# Patient Record
Sex: FEMALE | Race: WHITE | URBAN - METROPOLITAN AREA
[De-identification: names, ages, dates, MRNs, and addresses within clinical notes are randomized per-mention and may not be internally consistent; named-entity substitution may affect disease eponyms.]

---

## 2021-02-08 ENCOUNTER — WEB ENCOUNTER (OUTPATIENT)
Dept: URBAN - METROPOLITAN AREA CLINIC 96 | Facility: CLINIC | Age: 26
End: 2021-02-08

## 2021-02-08 ENCOUNTER — OFFICE VISIT (OUTPATIENT)
Dept: URBAN - METROPOLITAN AREA CLINIC 96 | Facility: CLINIC | Age: 26
End: 2021-02-08
Payer: COMMERCIAL

## 2021-02-08 DIAGNOSIS — R14.0 BLOATING: ICD-10-CM

## 2021-02-08 DIAGNOSIS — R12 HEARTBURN: ICD-10-CM

## 2021-02-08 DIAGNOSIS — R11.2 NAUSEA AND VOMITING, INTRACTABILITY OF VOMITING NOT SPECIFIED, UNSPECIFIED VOMITING TYPE: ICD-10-CM

## 2021-02-08 DIAGNOSIS — R10.13 EPIGASTRIC PAIN: ICD-10-CM

## 2021-02-08 PROCEDURE — 99204 OFFICE O/P NEW MOD 45 MIN: CPT | Performed by: INTERNAL MEDICINE

## 2021-02-08 PROCEDURE — G8420 CALC BMI NORM PARAMETERS: HCPCS | Performed by: INTERNAL MEDICINE

## 2021-02-08 PROCEDURE — G9903 PT SCRN TBCO ID AS NON USER: HCPCS | Performed by: INTERNAL MEDICINE

## 2021-02-08 PROCEDURE — G8483 FLU IMM NO ADMIN DOC REA: HCPCS | Performed by: INTERNAL MEDICINE

## 2021-02-08 PROCEDURE — 1036F TOBACCO NON-USER: CPT | Performed by: INTERNAL MEDICINE

## 2021-02-08 PROCEDURE — G8427 DOCREV CUR MEDS BY ELIG CLIN: HCPCS | Performed by: INTERNAL MEDICINE

## 2021-02-08 RX ORDER — ONDANSETRON HYDROCHLORIDE 4 MG/1
1 TABLET TABLET, FILM COATED ORAL ONCE A DAY
Status: ACTIVE | COMMUNITY

## 2021-02-08 RX ORDER — NITROFURANTOIN MONOHYDRATE/MACROCRYSTALLINE 25; 75 MG/1; MG/1
1 CAPSULE AT BEDTIME WITH FOOD CAPSULE ORAL ONCE A DAY
Status: ACTIVE | COMMUNITY

## 2021-02-08 RX ORDER — TRAMADOL HYDROCHLORIDE 50 MG/1
1 TABLET AS NEEDED TABLET, FILM COATED ORAL ONCE A DAY
Status: ACTIVE | COMMUNITY

## 2021-02-08 RX ORDER — DICYCLOMINE HYDROCHLORIDE 10 MG/1
2 CAPSULES CAPSULE ORAL THREE TIMES A DAY
Status: ACTIVE | COMMUNITY

## 2021-02-08 RX ORDER — CYCLOBENZAPRINE HYDROCHLORIDE 5 MG/1
1 TABLET AT BEDTIME AS NEEDED TABLET, FILM COATED ORAL ONCE A DAY
Status: ACTIVE | COMMUNITY

## 2021-02-08 NOTE — HPI-OTHER HISTORIES
26 yo female presents with sudden onset epigastric pain. No prior such sx. Awoke her from sleep with n/v, no hemetemesis, no coffee ground emesis.  Onset after eating tacos out. No fever, did have chills. Seen at Urgent Care with negative eval and provided Zofran. No melena, no rectal bleeding, did have diarrhea. Did have reflux sx. Does not take any meds for GERD. Reports back pain and will take ibuprofen prn.   RUQ ultrasound 2/2/2021 normal. CXR normal. Labs with WBC 6.8, Hb 15.2, platelet 293. AST 16, ALT 15, lipase 19. Urine tox screen positive for opiate. (see chart for reviewed records). Take Ultram as needed for back pain. MRI back normal last 3/2020.   Denies any psych hx. No eating d/o. No prior celiac testing. No diet restrictions.   No fam hx of GI CA, no fam hx of celiac disease.   No hx of PUD. Regular alcohol use. No MJ. No chance pregnant. No STD. No hx of hepatitis. Moved from [place], [place]. Trying to find new primary MD.   No hx of endometriosis. No GYN issues. Does report period pains--heraclio d/w with GYN once established.  Does report bloating, denies any early satiety.  No prior CT. No sick exposure. Pain improved. Able to tolerate po intake, trying to minimize spicy foods.

## 2021-03-02 ENCOUNTER — OFFICE VISIT (OUTPATIENT)
Dept: URBAN - METROPOLITAN AREA SURGERY CENTER 18 | Facility: SURGERY CENTER | Age: 26
End: 2021-03-02
Payer: COMMERCIAL

## 2021-03-02 DIAGNOSIS — K29.30 CHRONIC SUPERFICIAL GASTRITIS: ICD-10-CM

## 2021-03-02 DIAGNOSIS — R11.2 ACUTE NAUSEA WITH NONBILIOUS VOMITING: ICD-10-CM

## 2021-03-02 PROCEDURE — G8907 PT DOC NO EVENTS ON DISCHARG: HCPCS | Performed by: INTERNAL MEDICINE

## 2021-03-02 PROCEDURE — 43239 EGD BIOPSY SINGLE/MULTIPLE: CPT | Performed by: INTERNAL MEDICINE

## 2021-03-02 RX ORDER — TRAMADOL HYDROCHLORIDE 50 MG/1
1 TABLET AS NEEDED TABLET, FILM COATED ORAL ONCE A DAY
COMMUNITY

## 2021-03-02 RX ORDER — DICYCLOMINE HYDROCHLORIDE 10 MG/1
2 CAPSULES CAPSULE ORAL THREE TIMES A DAY
COMMUNITY

## 2021-03-02 RX ORDER — CYCLOBENZAPRINE HYDROCHLORIDE 5 MG/1
1 TABLET AT BEDTIME AS NEEDED TABLET, FILM COATED ORAL ONCE A DAY
COMMUNITY

## 2021-03-02 RX ORDER — NITROFURANTOIN MONOHYDRATE/MACROCRYSTALLINE 25; 75 MG/1; MG/1
1 CAPSULE AT BEDTIME WITH FOOD CAPSULE ORAL ONCE A DAY
COMMUNITY

## 2021-03-02 RX ORDER — ONDANSETRON HYDROCHLORIDE 4 MG/1
1 TABLET TABLET, FILM COATED ORAL ONCE A DAY
COMMUNITY

## 2021-03-02 NOTE — HPI-OTHER HISTORIES
24 yo female seen 2/8/2021 after ER visit 2/2/2021 for sudden onset epigastric pain. No prior such sx. Awoke her from sleep with n/v, no hemetemesis, no coffee ground emesis.  Onset after eating tacos out. No fever, did have chills. Seen at Urgent Care with negative eval and provided Zofran. No melena, no rectal bleeding, did have diarrhea. Did have reflux sx. Does not take any meds for GERD. Reports back pain and will take ibuprofen prn.   RUQ ultrasound 2/2/2021 normal. CXR normal. Labs with WBC 6.8, Hb 15.2, platelet 293. AST 16, ALT 15, lipase 19. Urine tox screen positive for opiate. (see chart for reviewed records). Take Ultram as needed for back pain. MRI back normal last 3/2020.   Denies any psych hx. No eating d/o. No prior celiac testing. No diet restrictions.   No fam hx of GI CA, no fam hx of celiac disease.   No hx of PUD. Regular alcohol use. No MJ. No chance pregnant. No STD. No hx of hepatitis. Moved from [place], [place]. Trying to find new primary MD.   No hx of endometriosis. No GYN issues. Does report period pains--heraclio d/w with GYN once established.  Does report bloating, denies any early satiety.  No prior CT. No sick exposure. Pain improved. Able to tolerate po intake, trying to minimize spicy foods.

## 2021-07-12 ENCOUNTER — TELEPHONE ENCOUNTER (OUTPATIENT)
Dept: URBAN - METROPOLITAN AREA SURGERY CENTER 30 | Facility: SURGERY CENTER | Age: 26
End: 2021-07-12

## 2021-07-12 RX ORDER — ONDANSETRON HYDROCHLORIDE 4 MG/1
1 TABLET TABLET, FILM COATED ORAL ONCE A DAY
Qty: 30 TABLET | Refills: 0

## 2021-07-12 RX ORDER — DICYCLOMINE HYDROCHLORIDE 10 MG/1
2 CAPSULES CAPSULE ORAL BID
Qty: 120 CAPSULE | Refills: 1

## 2021-08-03 ENCOUNTER — ERX REFILL RESPONSE (OUTPATIENT)
Dept: URBAN - METROPOLITAN AREA CLINIC 96 | Facility: CLINIC | Age: 26
End: 2021-08-03

## 2021-08-03 RX ORDER — DICYCLOMINE HYDROCHLORIDE 10 MG/1
TAKE 2 CAPSULES BY MOUTH TWICE DAILY FOR 30 DAYS CAPSULE ORAL
Qty: 120 CAPSULE | Refills: 2 | OUTPATIENT

## 2021-08-03 RX ORDER — DICYCLOMINE HYDROCHLORIDE 10 MG/1
2 CAPSULES CAPSULE ORAL BID
Qty: 120 CAPSULE | Refills: 1 | OUTPATIENT

## 2021-12-01 ENCOUNTER — TELEPHONE ENCOUNTER (OUTPATIENT)
Dept: URBAN - METROPOLITAN AREA CLINIC 98 | Facility: CLINIC | Age: 26
End: 2021-12-01

## 2021-12-21 ENCOUNTER — OFFICE VISIT (OUTPATIENT)
Dept: URBAN - METROPOLITAN AREA CLINIC 96 | Facility: CLINIC | Age: 26
End: 2021-12-21
Payer: COMMERCIAL

## 2021-12-21 VITALS
TEMPERATURE: 98.4 F | DIASTOLIC BLOOD PRESSURE: 81 MMHG | HEART RATE: 82 BPM | BODY MASS INDEX: 23.6 KG/M2 | HEIGHT: 61 IN | WEIGHT: 125 LBS | SYSTOLIC BLOOD PRESSURE: 113 MMHG

## 2021-12-21 DIAGNOSIS — R11.2 NAUSEA AND VOMITING, INTRACTABILITY OF VOMITING NOT SPECIFIED, UNSPECIFIED VOMITING TYPE: ICD-10-CM

## 2021-12-21 DIAGNOSIS — R93.3 ABNORMAL CT SCAN, SMALL BOWEL: ICD-10-CM

## 2021-12-21 DIAGNOSIS — K56.1 SMALL BOWEL INTUSSUSCEPTION: ICD-10-CM

## 2021-12-21 DIAGNOSIS — R10.84 GENERALIZED ABDOMINAL PAIN: ICD-10-CM

## 2021-12-21 DIAGNOSIS — K29.70 GASTRITIS WITHOUT BLEEDING, UNSPECIFIED CHRONICITY, UNSPECIFIED GASTRITIS TYPE: ICD-10-CM

## 2021-12-21 DIAGNOSIS — K44.9 HIATAL HERNIA: ICD-10-CM

## 2021-12-21 DIAGNOSIS — R19.7 DIARRHEA, UNSPECIFIED TYPE: ICD-10-CM

## 2021-12-21 PROCEDURE — 99214 OFFICE O/P EST MOD 30 MIN: CPT | Performed by: INTERNAL MEDICINE

## 2021-12-21 RX ORDER — CYCLOBENZAPRINE HYDROCHLORIDE 5 MG/1
1 TABLET AT BEDTIME AS NEEDED TABLET, FILM COATED ORAL ONCE A DAY
Status: ACTIVE | COMMUNITY

## 2021-12-21 RX ORDER — NITROFURANTOIN MONOHYDRATE/MACROCRYSTALLINE 25; 75 MG/1; MG/1
1 CAPSULE AT BEDTIME WITH FOOD CAPSULE ORAL ONCE A DAY
Status: ACTIVE | COMMUNITY

## 2021-12-21 RX ORDER — ONDANSETRON HYDROCHLORIDE 4 MG/1
1 TABLET TABLET, FILM COATED ORAL ONCE A DAY
Qty: 30 TABLET | Refills: 0 | Status: ACTIVE | COMMUNITY

## 2021-12-21 RX ORDER — DICYCLOMINE HYDROCHLORIDE 10 MG/1
TAKE 2 CAPSULES BY MOUTH TWICE DAILY FOR 30 DAYS CAPSULE ORAL
Qty: 120 CAPSULE | Refills: 2 | Status: ACTIVE | COMMUNITY

## 2021-12-21 RX ORDER — TRAMADOL HYDROCHLORIDE 50 MG/1
1 TABLET AS NEEDED TABLET, FILM COATED ORAL ONCE A DAY
Status: ACTIVE | COMMUNITY

## 2021-12-21 NOTE — HPI-OTHER HISTORIES
26 yo female seen 2/8/2021 after ER visit 2/2/2021 for sudden onset epigastric pain. No prior such sx. Awoke her from sleep with n/v, no hemetemesis, no coffee ground emesis.  Onset after eating tacos out. No fever, did have chills. Seen at Urgent Care with negative eval and provided Zofran. No melena, no rectal bleeding, did have diarrhea. Did have reflux sx. Does not take any meds for GERD. Reports back pain and will take ibuprofen prn.   RUQ ultrasound 2/2/2021 normal. CXR normal. Labs with WBC 6.8, Hb 15.2, platelet 293. AST 16, ALT 15, lipase 19. Urine tox screen positive for opiate. (see chart for reviewed records). Take Ultram as needed for back pain. MRI back normal last 3/2020.   Denies any psych hx. No eating d/o. No prior celiac testing. No diet restrictions.   No fam hx of GI CA, no fam hx of celiac disease.   No hx of PUD. Regular alcohol use. No MJ. No chance pregnant. No STD. No hx of hepatitis. Moved from [place], [place]. Trying to find new primary MD.   No hx of endometriosis. No GYN issues. Does report period pains--heraclio d/w with GYN once established.  Does report bloating, denies any early satiety.  No sick exposure. Pain improved. Able to tolerate po intake, trying to minimize spicy foods.  EGD done 3/2/2021 with small hh noted, normal duodenal bx, mild gastritis with no H pylori, lower esophageal bx normal.  Overall doing well aside from 1 recent episode 3 weeks ago. Reports had decreased appetite, nausea with emesis. No post prandial exacerbation. No alcohol. No MJ use. No hematemesis, no coffee ground emesis. No melena, no rectal bleeding. Had diarrhea with watery stool. No diet changes, no sick exposure. Seen at ER and CT imaging was done 12/2/2021. CT with small bowel small bowel intussusception, likely incidental. Otherwise no acute abrnomality identified. WBC 5.0, Hb 16.2. AST 23, ALT 13. Lipase 25. (see chart for reviewed records)

## 2022-01-06 ENCOUNTER — LAB OUTSIDE AN ENCOUNTER (OUTPATIENT)
Dept: URBAN - METROPOLITAN AREA CLINIC 96 | Facility: CLINIC | Age: 27
End: 2022-01-06

## 2022-01-10 ENCOUNTER — DASHBOARD ENCOUNTERS (OUTPATIENT)
Age: 27
End: 2022-01-10

## 2022-01-10 ENCOUNTER — WEB ENCOUNTER (OUTPATIENT)
Dept: URBAN - METROPOLITAN AREA CLINIC 96 | Facility: CLINIC | Age: 27
End: 2022-01-10

## 2022-01-10 ENCOUNTER — OFFICE VISIT (OUTPATIENT)
Dept: URBAN - METROPOLITAN AREA CLINIC 96 | Facility: CLINIC | Age: 27
End: 2022-01-10
Payer: COMMERCIAL

## 2022-01-10 DIAGNOSIS — R63.5 WEIGHT GAIN: ICD-10-CM

## 2022-01-10 DIAGNOSIS — K29.70 GASTRITIS WITHOUT BLEEDING, UNSPECIFIED CHRONICITY, UNSPECIFIED GASTRITIS TYPE: ICD-10-CM

## 2022-01-10 DIAGNOSIS — R11.2 NAUSEA AND VOMITING, UNSPECIFIED VOMITING TYPE: ICD-10-CM

## 2022-01-10 DIAGNOSIS — R10.10 PAIN OF UPPER ABDOMEN: ICD-10-CM

## 2022-01-10 PROBLEM — 4556007: Status: ACTIVE | Noted: 2021-12-21

## 2022-01-10 PROCEDURE — 99213 OFFICE O/P EST LOW 20 MIN: CPT | Performed by: PHYSICIAN ASSISTANT

## 2022-01-10 RX ORDER — ONDANSETRON HYDROCHLORIDE 4 MG/1
1 TABLET TABLET, FILM COATED ORAL ONCE A DAY
Qty: 30 TABLET | Refills: 0 | Status: ACTIVE | COMMUNITY

## 2022-01-10 RX ORDER — CYCLOBENZAPRINE HYDROCHLORIDE 5 MG/1
1 TABLET AT BEDTIME AS NEEDED TABLET, FILM COATED ORAL ONCE A DAY
Status: ACTIVE | COMMUNITY

## 2022-01-10 RX ORDER — DICYCLOMINE HYDROCHLORIDE 10 MG/1
1 TABLET CAPSULE ORAL
Qty: 60 | Refills: 2

## 2022-01-10 RX ORDER — DICYCLOMINE HYDROCHLORIDE 10 MG/1
TAKE 2 CAPSULES BY MOUTH TWICE DAILY FOR 30 DAYS CAPSULE ORAL
Qty: 120 CAPSULE | Refills: 2 | Status: ACTIVE | COMMUNITY

## 2022-01-10 RX ORDER — TRAMADOL HYDROCHLORIDE 50 MG/1
1 TABLET AS NEEDED TABLET, FILM COATED ORAL ONCE A DAY
Status: ACTIVE | COMMUNITY

## 2022-01-10 RX ORDER — ONDANSETRON 8 MG/1
1 TABLET ON THE TONGUE AND ALLOW TO DISSOLVE  AS NEEDED TABLET, ORALLY DISINTEGRATING ORAL
Qty: 30 | Refills: 3 | OUTPATIENT
Start: 2022-01-10

## 2022-01-10 RX ORDER — FAMOTIDINE 40 MG/1
1 TABLET AT BEDTIME TABLET, FILM COATED ORAL ONCE A DAY
Qty: 90 TABLET | Refills: 3 | OUTPATIENT
Start: 2022-01-10

## 2022-01-10 RX ORDER — NITROFURANTOIN MONOHYDRATE/MACROCRYSTALLINE 25; 75 MG/1; MG/1
1 CAPSULE AT BEDTIME WITH FOOD CAPSULE ORAL ONCE A DAY
Status: ACTIVE | COMMUNITY

## 2022-01-10 RX ORDER — PANTOPRAZOLE SODIUM 40 MG/1
1 TABLET TABLET, DELAYED RELEASE ORAL ONCE A DAY
Qty: 90 TABLET | Refills: 1 | OUTPATIENT
Start: 2022-01-10

## 2022-01-10 NOTE — HPI-TODAY'S VISIT:
Pt feels fine right now Small bowel follow thru was normal - moved thru fast When she is sick she gets intense midepigastric pain for days - chest pain into upper back, nausea, emesis, diarrhea, fevers and chills - lasts 4-7 days Usually will not even be able to keep water down and needs iv fluids Happened 4 times in 2021 Has had egd, ct and sbft done  Could not tolerate zofran po when it happens - could not keep it down - does have some phenergan supp for next time from the ER Her normal bowel habit is 1-2 times a week

## 2022-01-11 LAB
A/G RATIO: 1.9
ALBUMIN: 4.6
ALKALINE PHOSPHATASE: 49
ALT (SGPT): 13
AST (SGOT): 20
BASO (ABSOLUTE): 0
BASOS: 0
BILIRUBIN, TOTAL: 0.3
BUN/CREATININE RATIO: 12
BUN: 9
C-REACTIVE PROTEIN, QUANT: <1
CALCIUM: 9.4
CARBON DIOXIDE, TOTAL: 21
CHLORIDE: 103
CREATININE: 0.73
EGFR IF AFRICN AM: 131
EGFR IF NONAFRICN AM: 113
EOS (ABSOLUTE): 0.2
EOS: 3
GLOBULIN, TOTAL: 2.4
GLUCOSE: 92
HEMATOCRIT: 43.4
HEMATOLOGY COMMENTS:: (no result)
HEMOGLOBIN: 14.2
IMMATURE CELLS: (no result)
IMMATURE GRANS (ABS): 0
IMMATURE GRANULOCYTES: 0
LYMPHS (ABSOLUTE): 1.7
LYMPHS: 32
MCH: 28.4
MCHC: 32.7
MCV: 87
MONOCYTES(ABSOLUTE): 0.5
MONOCYTES: 10
NEUTROPHILS (ABSOLUTE): 2.9
NEUTROPHILS: 55
NRBC: (no result)
PLATELETS: 269
POTASSIUM: 4.5
PROTEIN, TOTAL: 7
RBC: 5
RDW: 13.1
SODIUM: 136
T4,FREE(DIRECT): 1.19
TSH: 0.94
WBC: 5.2

## 2022-08-23 ENCOUNTER — WEB ENCOUNTER (OUTPATIENT)
Dept: URBAN - METROPOLITAN AREA CLINIC 96 | Facility: CLINIC | Age: 27
End: 2022-08-23

## 2023-01-12 ENCOUNTER — TELEPHONE ENCOUNTER (OUTPATIENT)
Dept: URBAN - METROPOLITAN AREA CLINIC 96 | Facility: CLINIC | Age: 28
End: 2023-01-12

## 2023-01-13 ENCOUNTER — WEB ENCOUNTER (OUTPATIENT)
Dept: URBAN - METROPOLITAN AREA CLINIC 96 | Facility: CLINIC | Age: 28
End: 2023-01-13

## 2023-01-17 ENCOUNTER — WEB ENCOUNTER (OUTPATIENT)
Dept: URBAN - METROPOLITAN AREA CLINIC 96 | Facility: CLINIC | Age: 28
End: 2023-01-17

## 2023-01-18 ENCOUNTER — WEB ENCOUNTER (OUTPATIENT)
Dept: URBAN - METROPOLITAN AREA CLINIC 96 | Facility: CLINIC | Age: 28
End: 2023-01-18

## 2023-01-27 ENCOUNTER — WEB ENCOUNTER (OUTPATIENT)
Dept: URBAN - METROPOLITAN AREA CLINIC 96 | Facility: CLINIC | Age: 28
End: 2023-01-27